# Patient Record
Sex: FEMALE | Race: OTHER | HISPANIC OR LATINO | URBAN - METROPOLITAN AREA
[De-identification: names, ages, dates, MRNs, and addresses within clinical notes are randomized per-mention and may not be internally consistent; named-entity substitution may affect disease eponyms.]

---

## 2018-07-24 ENCOUNTER — EMERGENCY (EMERGENCY)
Facility: HOSPITAL | Age: 33
LOS: 1 days | Discharge: HOME | End: 2018-07-24
Attending: EMERGENCY MEDICINE
Payer: MEDICAID

## 2018-07-24 VITALS
RESPIRATION RATE: 22 BRPM | HEART RATE: 95 BPM | WEIGHT: 149.91 LBS | TEMPERATURE: 98 F | SYSTOLIC BLOOD PRESSURE: 110 MMHG | OXYGEN SATURATION: 99 % | DIASTOLIC BLOOD PRESSURE: 77 MMHG

## 2018-07-24 DIAGNOSIS — Z98.51 TUBAL LIGATION STATUS: Chronic | ICD-10-CM

## 2018-07-24 LAB — HCG UR QL: NEGATIVE — SIGNIFICANT CHANGE UP

## 2018-07-24 PROCEDURE — 81025 URINE PREGNANCY TEST: CPT

## 2018-07-24 PROCEDURE — 70360 X-RAY EXAM OF NECK: CPT

## 2018-07-24 PROCEDURE — 99283 EMERGENCY DEPT VISIT LOW MDM: CPT | Mod: 25

## 2018-07-24 PROCEDURE — 96372 THER/PROPH/DIAG INJ SC/IM: CPT

## 2018-07-24 PROCEDURE — 99284 EMERGENCY DEPT VISIT MOD MDM: CPT

## 2018-07-24 PROCEDURE — 70360 X-RAY EXAM OF NECK: CPT | Mod: 26

## 2018-07-24 PROCEDURE — 94640 AIRWAY INHALATION TREATMENT: CPT

## 2018-07-24 RX ORDER — DEXAMETHASONE 0.5 MG/5ML
10 ELIXIR ORAL ONCE
Qty: 0 | Refills: 0 | Status: COMPLETED | OUTPATIENT
Start: 2018-07-24 | End: 2018-07-24

## 2018-07-24 RX ORDER — ONDANSETRON 8 MG/1
4 TABLET, FILM COATED ORAL ONCE
Qty: 0 | Refills: 0 | Status: COMPLETED | OUTPATIENT
Start: 2018-07-24 | End: 2018-07-24

## 2018-07-24 RX ORDER — IPRATROPIUM/ALBUTEROL SULFATE 18-103MCG
3 AEROSOL WITH ADAPTER (GRAM) INHALATION ONCE
Qty: 0 | Refills: 0 | Status: COMPLETED | OUTPATIENT
Start: 2018-07-24 | End: 2018-07-24

## 2018-07-24 RX ADMIN — Medication 3 MILLILITER(S): at 17:09

## 2018-07-24 RX ADMIN — Medication 10 MILLIGRAM(S): at 17:11

## 2018-07-24 NOTE — ED PROVIDER NOTE - OBJECTIVE STATEMENT
34 y/o F pt with PMHx of asthma (no h/o intubation or hospitalization) and PSHx appendectomy and tubal ligation presents to ED c/o shortness of breath and wheezing x today. Pt also reports dizziness, nausea, dry cough, and chest tightness with inspiration. Pt reports last episode of asthma exacerbation about 4-5 months ago that resolved with treatment at home (nebulizer machine). Pt relays that she was feeling congested today prior to episode of shortness of breath and attributes congestion to likely allergies. Pt does not report any treatment prior to arrival. Pt also c/o intermittent minimal abd pain and episode of vomiting (nonbloody, nonbilious) x 2 days. Pt describe abd pain as mild cramping, "poking" and 4/10 severity. Pt denies choking or inspiration, fever, chills, diarrhea, dysuria, burning urination, vaginal discharge, or any other complaints. LMP: last week. ALLERGIES: penicillin (rash).

## 2018-07-24 NOTE — ED PROVIDER NOTE - MEDICAL DECISION MAKING DETAILS
Possible asthma exacerbation versus allergic reaction versus adult form of croup. Will treat with albuterol, Ativan, steroids, check neck X-ray and reevaluate.

## 2018-07-24 NOTE — ED PROVIDER NOTE - PROGRESS NOTE DETAILS
feels much better, asymptomatic, SOB resolved. Lungs clear. Xrays wnl. Will DC w PCP/pulmonary f/u as needed

## 2022-01-01 NOTE — ED PROVIDER NOTE - CROS ED NEURO ALL NEG
- - -
Smitha Lacy (DO)  Gen Peds  [facility]  [phone] [place] [address]  Phone: [phone]  Fax: [phone]  Follow Up Time: 1-3 days